# Patient Record
Sex: FEMALE | Race: WHITE | NOT HISPANIC OR LATINO | URBAN - METROPOLITAN AREA
[De-identification: names, ages, dates, MRNs, and addresses within clinical notes are randomized per-mention and may not be internally consistent; named-entity substitution may affect disease eponyms.]

---

## 2017-04-04 ENCOUNTER — INPATIENT (INPATIENT)
Facility: HOSPITAL | Age: 22
LOS: 2 days | Discharge: HOME | End: 2017-04-07
Attending: OBSTETRICS & GYNECOLOGY | Admitting: OBSTETRICS & GYNECOLOGY
Payer: COMMERCIAL

## 2017-04-04 VITALS — HEIGHT: 64 IN | WEIGHT: 132.28 LBS

## 2017-04-04 DIAGNOSIS — O47.1 FALSE LABOR AT OR AFTER 37 COMPLETED WEEKS OF GESTATION: ICD-10-CM

## 2017-04-04 DIAGNOSIS — Z34.80 ENCOUNTER FOR SUPERVISION OF OTHER NORMAL PREGNANCY, UNSPECIFIED TRIMESTER: ICD-10-CM

## 2017-04-04 LAB
BASOPHILS # BLD AUTO: 0 K/UL — SIGNIFICANT CHANGE UP (ref 0–0.2)
BASOPHILS NFR BLD AUTO: 0.2 % — SIGNIFICANT CHANGE UP (ref 0–2)
EOSINOPHIL # BLD AUTO: 0.1 K/UL — SIGNIFICANT CHANGE UP (ref 0–0.5)
EOSINOPHIL NFR BLD AUTO: 1.2 % — SIGNIFICANT CHANGE UP (ref 0–6)
HCT VFR BLD CALC: 33.8 % — LOW (ref 34.5–45)
HGB BLD-MCNC: 11.6 G/DL — SIGNIFICANT CHANGE UP (ref 11.5–15.5)
LYMPHOCYTES # BLD AUTO: 1.7 K/UL — SIGNIFICANT CHANGE UP (ref 1–3.3)
LYMPHOCYTES # BLD AUTO: 21.2 % — SIGNIFICANT CHANGE UP (ref 13–44)
MCHC RBC-ENTMCNC: 31.2 PG — SIGNIFICANT CHANGE UP (ref 27–34)
MCHC RBC-ENTMCNC: 34.2 GM/DL — SIGNIFICANT CHANGE UP (ref 32–36)
MCV RBC AUTO: 91.3 FL — SIGNIFICANT CHANGE UP (ref 80–100)
MONOCYTES # BLD AUTO: 0.6 K/UL — SIGNIFICANT CHANGE UP (ref 0–0.9)
MONOCYTES NFR BLD AUTO: 7.7 % — SIGNIFICANT CHANGE UP (ref 2–14)
NEUTROPHILS # BLD AUTO: 5.5 K/UL — SIGNIFICANT CHANGE UP (ref 1.8–7.4)
NEUTROPHILS NFR BLD AUTO: 69.7 % — SIGNIFICANT CHANGE UP (ref 43–77)
PLATELET # BLD AUTO: 167 K/UL — SIGNIFICANT CHANGE UP (ref 150–400)
RBC # BLD: 3.7 M/UL — LOW (ref 3.8–5.2)
RBC # FLD: 12.5 % — SIGNIFICANT CHANGE UP (ref 10.3–14.5)
WBC # BLD: 7.9 K/UL — SIGNIFICANT CHANGE UP (ref 3.8–10.5)
WBC # FLD AUTO: 7.9 K/UL — SIGNIFICANT CHANGE UP (ref 3.8–10.5)

## 2017-04-04 RX ORDER — SODIUM CHLORIDE 9 MG/ML
500 INJECTION, SOLUTION INTRAVENOUS ONCE
Qty: 0 | Refills: 0 | Status: COMPLETED | OUTPATIENT
Start: 2017-04-04 | End: 2017-04-04

## 2017-04-04 RX ORDER — PENICILLIN G POTASSIUM 5000000 [IU]/1
POWDER, FOR SOLUTION INTRAMUSCULAR; INTRAPLEURAL; INTRATHECAL; INTRAVENOUS
Qty: 0 | Refills: 0 | Status: DISCONTINUED | OUTPATIENT
Start: 2017-04-04 | End: 2017-04-05

## 2017-04-04 RX ORDER — PENICILLIN G POTASSIUM 5000000 [IU]/1
5 POWDER, FOR SOLUTION INTRAMUSCULAR; INTRAPLEURAL; INTRATHECAL; INTRAVENOUS ONCE
Qty: 0 | Refills: 0 | Status: COMPLETED | OUTPATIENT
Start: 2017-04-04 | End: 2017-04-04

## 2017-04-04 RX ORDER — CITRIC ACID/SODIUM CITRATE 300-500 MG
15 SOLUTION, ORAL ORAL EVERY 4 HOURS
Qty: 0 | Refills: 0 | Status: DISCONTINUED | OUTPATIENT
Start: 2017-04-04 | End: 2017-04-05

## 2017-04-04 RX ORDER — PENICILLIN G POTASSIUM 5000000 [IU]/1
2.5 POWDER, FOR SOLUTION INTRAMUSCULAR; INTRAPLEURAL; INTRATHECAL; INTRAVENOUS EVERY 4 HOURS
Qty: 0 | Refills: 0 | Status: DISCONTINUED | OUTPATIENT
Start: 2017-04-05 | End: 2017-04-05

## 2017-04-04 RX ORDER — SODIUM CHLORIDE 9 MG/ML
1000 INJECTION, SOLUTION INTRAVENOUS
Qty: 0 | Refills: 0 | Status: DISCONTINUED | OUTPATIENT
Start: 2017-04-04 | End: 2017-04-05

## 2017-04-04 RX ORDER — OXYTOCIN 10 UNIT/ML
333.33 VIAL (ML) INJECTION
Qty: 20 | Refills: 0 | Status: DISCONTINUED | OUTPATIENT
Start: 2017-04-04 | End: 2017-04-05

## 2017-04-04 RX ADMIN — PENICILLIN G POTASSIUM 200 MILLION UNIT(S): 5000000 POWDER, FOR SOLUTION INTRAMUSCULAR; INTRAPLEURAL; INTRATHECAL; INTRAVENOUS at 21:29

## 2017-04-04 RX ADMIN — SODIUM CHLORIDE 1000 MILLILITER(S): 9 INJECTION, SOLUTION INTRAVENOUS at 21:29

## 2017-04-05 LAB
BLD GP AB SCN SERPL QL: NEGATIVE — SIGNIFICANT CHANGE UP
RH IG SCN BLD-IMP: POSITIVE — SIGNIFICANT CHANGE UP
T PALLIDUM AB TITR SER: NEGATIVE — SIGNIFICANT CHANGE UP

## 2017-04-05 RX ORDER — HYDROCORTISONE 1 %
1 OINTMENT (GRAM) TOPICAL EVERY 4 HOURS
Qty: 0 | Refills: 0 | Status: DISCONTINUED | OUTPATIENT
Start: 2017-04-06 | End: 2017-04-07

## 2017-04-05 RX ORDER — OXYTOCIN 10 UNIT/ML
4 VIAL (ML) INJECTION
Qty: 30 | Refills: 0 | Status: DISCONTINUED | OUTPATIENT
Start: 2017-04-05 | End: 2017-04-05

## 2017-04-05 RX ORDER — OXYTOCIN 10 UNIT/ML
333.33 VIAL (ML) INJECTION
Qty: 20 | Refills: 0 | Status: DISCONTINUED | OUTPATIENT
Start: 2017-04-05 | End: 2017-04-07

## 2017-04-05 RX ORDER — OXYTOCIN 10 UNIT/ML
41.67 VIAL (ML) INJECTION
Qty: 20 | Refills: 0 | Status: DISCONTINUED | OUTPATIENT
Start: 2017-04-06 | End: 2017-04-07

## 2017-04-05 RX ORDER — SODIUM CHLORIDE 9 MG/ML
3 INJECTION INTRAMUSCULAR; INTRAVENOUS; SUBCUTANEOUS EVERY 8 HOURS
Qty: 0 | Refills: 0 | Status: DISCONTINUED | OUTPATIENT
Start: 2017-04-05 | End: 2017-04-05

## 2017-04-05 RX ORDER — SODIUM CHLORIDE 9 MG/ML
3 INJECTION INTRAMUSCULAR; INTRAVENOUS; SUBCUTANEOUS EVERY 8 HOURS
Qty: 0 | Refills: 0 | Status: DISCONTINUED | OUTPATIENT
Start: 2017-04-06 | End: 2017-04-07

## 2017-04-05 RX ORDER — OXYCODONE HYDROCHLORIDE 5 MG/1
5 TABLET ORAL
Qty: 0 | Refills: 0 | Status: DISCONTINUED | OUTPATIENT
Start: 2017-04-05 | End: 2017-04-07

## 2017-04-05 RX ORDER — DIBUCAINE 1 %
1 OINTMENT (GRAM) RECTAL EVERY 4 HOURS
Qty: 0 | Refills: 0 | Status: DISCONTINUED | OUTPATIENT
Start: 2017-04-05 | End: 2017-04-05

## 2017-04-05 RX ORDER — KETOROLAC TROMETHAMINE 30 MG/ML
30 SYRINGE (ML) INJECTION ONCE
Qty: 0 | Refills: 0 | Status: DISCONTINUED | OUTPATIENT
Start: 2017-04-05 | End: 2017-04-05

## 2017-04-05 RX ORDER — AER TRAVELER 0.5 G/1
1 SOLUTION RECTAL; TOPICAL EVERY 4 HOURS
Qty: 0 | Refills: 0 | Status: DISCONTINUED | OUTPATIENT
Start: 2017-04-05 | End: 2017-04-05

## 2017-04-05 RX ORDER — PRAMOXINE HYDROCHLORIDE 150 MG/15G
1 AEROSOL, FOAM RECTAL EVERY 4 HOURS
Qty: 0 | Refills: 0 | Status: DISCONTINUED | OUTPATIENT
Start: 2017-04-06 | End: 2017-04-07

## 2017-04-05 RX ORDER — FAMOTIDINE 10 MG/ML
20 INJECTION INTRAVENOUS ONCE
Qty: 0 | Refills: 0 | Status: COMPLETED | OUTPATIENT
Start: 2017-04-05 | End: 2017-04-05

## 2017-04-05 RX ORDER — LANOLIN
1 OINTMENT (GRAM) TOPICAL EVERY 6 HOURS
Qty: 0 | Refills: 0 | Status: DISCONTINUED | OUTPATIENT
Start: 2017-04-06 | End: 2017-04-07

## 2017-04-05 RX ORDER — DOCUSATE SODIUM 100 MG
100 CAPSULE ORAL
Qty: 0 | Refills: 0 | Status: DISCONTINUED | OUTPATIENT
Start: 2017-04-06 | End: 2017-04-07

## 2017-04-05 RX ORDER — PRAMOXINE HYDROCHLORIDE 150 MG/15G
1 AEROSOL, FOAM RECTAL EVERY 4 HOURS
Qty: 0 | Refills: 0 | Status: DISCONTINUED | OUTPATIENT
Start: 2017-04-05 | End: 2017-04-05

## 2017-04-05 RX ORDER — OXYTOCIN 10 UNIT/ML
333.33 VIAL (ML) INJECTION
Qty: 20 | Refills: 0 | Status: COMPLETED | OUTPATIENT
Start: 2017-04-05

## 2017-04-05 RX ORDER — DIBUCAINE 1 %
1 OINTMENT (GRAM) RECTAL EVERY 4 HOURS
Qty: 0 | Refills: 0 | Status: DISCONTINUED | OUTPATIENT
Start: 2017-04-06 | End: 2017-04-07

## 2017-04-05 RX ORDER — ACETAMINOPHEN 500 MG
975 TABLET ORAL EVERY 6 HOURS
Qty: 0 | Refills: 0 | Status: DISCONTINUED | OUTPATIENT
Start: 2017-04-05 | End: 2017-04-07

## 2017-04-05 RX ORDER — IBUPROFEN 200 MG
600 TABLET ORAL EVERY 6 HOURS
Qty: 0 | Refills: 0 | Status: DISCONTINUED | OUTPATIENT
Start: 2017-04-06 | End: 2017-04-07

## 2017-04-05 RX ORDER — OXYCODONE HYDROCHLORIDE 5 MG/1
5 TABLET ORAL EVERY 4 HOURS
Qty: 0 | Refills: 0 | Status: DISCONTINUED | OUTPATIENT
Start: 2017-04-05 | End: 2017-04-07

## 2017-04-05 RX ORDER — AER TRAVELER 0.5 G/1
1 SOLUTION RECTAL; TOPICAL EVERY 4 HOURS
Qty: 0 | Refills: 0 | Status: DISCONTINUED | OUTPATIENT
Start: 2017-04-06 | End: 2017-04-07

## 2017-04-05 RX ORDER — DIPHENHYDRAMINE HCL 50 MG
25 CAPSULE ORAL EVERY 6 HOURS
Qty: 0 | Refills: 0 | Status: DISCONTINUED | OUTPATIENT
Start: 2017-04-06 | End: 2017-04-07

## 2017-04-05 RX ORDER — MAGNESIUM HYDROXIDE 400 MG/1
30 TABLET, CHEWABLE ORAL
Qty: 0 | Refills: 0 | Status: DISCONTINUED | OUTPATIENT
Start: 2017-04-06 | End: 2017-04-07

## 2017-04-05 RX ORDER — SIMETHICONE 80 MG/1
80 TABLET, CHEWABLE ORAL EVERY 6 HOURS
Qty: 0 | Refills: 0 | Status: DISCONTINUED | OUTPATIENT
Start: 2017-04-06 | End: 2017-04-07

## 2017-04-05 RX ORDER — ACETAMINOPHEN 500 MG
650 TABLET ORAL ONCE
Qty: 0 | Refills: 0 | Status: DISCONTINUED | OUTPATIENT
Start: 2017-04-05 | End: 2017-04-05

## 2017-04-05 RX ORDER — OXYTOCIN 10 UNIT/ML
41.67 VIAL (ML) INJECTION
Qty: 20 | Refills: 0 | Status: DISCONTINUED | OUTPATIENT
Start: 2017-04-05 | End: 2017-04-05

## 2017-04-05 RX ORDER — HYDROCORTISONE 1 %
1 OINTMENT (GRAM) TOPICAL EVERY 4 HOURS
Qty: 0 | Refills: 0 | Status: DISCONTINUED | OUTPATIENT
Start: 2017-04-05 | End: 2017-04-05

## 2017-04-05 RX ORDER — GLYCERIN ADULT
1 SUPPOSITORY, RECTAL RECTAL AT BEDTIME
Qty: 0 | Refills: 0 | Status: DISCONTINUED | OUTPATIENT
Start: 2017-04-06 | End: 2017-04-07

## 2017-04-05 RX ADMIN — SODIUM CHLORIDE 125 MILLILITER(S): 9 INJECTION, SOLUTION INTRAVENOUS at 05:06

## 2017-04-05 RX ADMIN — FAMOTIDINE 20 MILLIGRAM(S): 10 INJECTION INTRAVENOUS at 18:05

## 2017-04-05 RX ADMIN — PENICILLIN G POTASSIUM 200 MILLION UNIT(S): 5000000 POWDER, FOR SOLUTION INTRAMUSCULAR; INTRAPLEURAL; INTRATHECAL; INTRAVENOUS at 01:30

## 2017-04-05 RX ADMIN — Medication 1000 MILLIUNIT(S)/MIN: at 21:53

## 2017-04-05 RX ADMIN — Medication 4 MILLIUNIT(S)/MIN: at 12:06

## 2017-04-05 RX ADMIN — PENICILLIN G POTASSIUM 200 MILLION UNIT(S): 5000000 POWDER, FOR SOLUTION INTRAMUSCULAR; INTRAPLEURAL; INTRATHECAL; INTRAVENOUS at 19:25

## 2017-04-05 RX ADMIN — PENICILLIN G POTASSIUM 200 MILLION UNIT(S): 5000000 POWDER, FOR SOLUTION INTRAMUSCULAR; INTRAPLEURAL; INTRATHECAL; INTRAVENOUS at 11:09

## 2017-04-05 RX ADMIN — Medication 30 MILLIGRAM(S): at 21:28

## 2017-04-05 RX ADMIN — Medication 4 MILLIUNIT(S)/MIN: at 19:26

## 2017-04-05 RX ADMIN — PENICILLIN G POTASSIUM 200 MILLION UNIT(S): 5000000 POWDER, FOR SOLUTION INTRAMUSCULAR; INTRAPLEURAL; INTRATHECAL; INTRAVENOUS at 15:02

## 2017-04-05 RX ADMIN — PENICILLIN G POTASSIUM 200 MILLION UNIT(S): 5000000 POWDER, FOR SOLUTION INTRAMUSCULAR; INTRAPLEURAL; INTRATHECAL; INTRAVENOUS at 06:19

## 2017-04-06 LAB
HCT VFR BLD CALC: 35.9 % — SIGNIFICANT CHANGE UP (ref 34.5–45)
HGB BLD-MCNC: 12.1 G/DL — SIGNIFICANT CHANGE UP (ref 11.5–15.5)

## 2017-04-06 RX ADMIN — Medication 600 MILLIGRAM(S): at 05:36

## 2017-04-06 RX ADMIN — OXYCODONE HYDROCHLORIDE 5 MILLIGRAM(S): 5 TABLET ORAL at 00:32

## 2017-04-06 RX ADMIN — Medication 600 MILLIGRAM(S): at 12:45

## 2017-04-06 RX ADMIN — Medication 975 MILLIGRAM(S): at 18:22

## 2017-04-06 RX ADMIN — Medication 100 MILLIGRAM(S): at 20:00

## 2017-04-06 RX ADMIN — Medication 975 MILLIGRAM(S): at 11:57

## 2017-04-06 RX ADMIN — Medication 100 MILLIGRAM(S): at 05:36

## 2017-04-06 RX ADMIN — OXYCODONE HYDROCHLORIDE 5 MILLIGRAM(S): 5 TABLET ORAL at 01:18

## 2017-04-06 RX ADMIN — Medication 600 MILLIGRAM(S): at 06:04

## 2017-04-06 RX ADMIN — Medication 975 MILLIGRAM(S): at 01:18

## 2017-04-06 RX ADMIN — Medication 600 MILLIGRAM(S): at 18:22

## 2017-04-06 RX ADMIN — Medication 975 MILLIGRAM(S): at 19:15

## 2017-04-06 RX ADMIN — Medication 975 MILLIGRAM(S): at 00:33

## 2017-04-06 RX ADMIN — Medication 975 MILLIGRAM(S): at 06:04

## 2017-04-06 RX ADMIN — Medication 600 MILLIGRAM(S): at 19:15

## 2017-04-06 RX ADMIN — Medication 600 MILLIGRAM(S): at 11:57

## 2017-04-06 RX ADMIN — Medication 975 MILLIGRAM(S): at 05:35

## 2017-04-06 RX ADMIN — Medication 975 MILLIGRAM(S): at 12:45

## 2017-04-06 RX ADMIN — Medication 1 TABLET(S): at 11:59

## 2017-04-07 VITALS
TEMPERATURE: 98 F | DIASTOLIC BLOOD PRESSURE: 71 MMHG | RESPIRATION RATE: 18 BRPM | SYSTOLIC BLOOD PRESSURE: 106 MMHG | HEART RATE: 69 BPM

## 2017-04-07 PROCEDURE — 86901 BLOOD TYPING SEROLOGIC RH(D): CPT

## 2017-04-07 PROCEDURE — 86780 TREPONEMA PALLIDUM: CPT

## 2017-04-07 PROCEDURE — 85018 HEMOGLOBIN: CPT

## 2017-04-07 PROCEDURE — G0463: CPT

## 2017-04-07 PROCEDURE — 86900 BLOOD TYPING SEROLOGIC ABO: CPT

## 2017-04-07 PROCEDURE — 59050 FETAL MONITOR W/REPORT: CPT

## 2017-04-07 PROCEDURE — 85027 COMPLETE CBC AUTOMATED: CPT

## 2017-04-07 PROCEDURE — 86850 RBC ANTIBODY SCREEN: CPT

## 2017-04-07 PROCEDURE — 59025 FETAL NON-STRESS TEST: CPT

## 2017-04-07 RX ADMIN — Medication 100 MILLIGRAM(S): at 13:27

## 2017-04-07 RX ADMIN — Medication 975 MILLIGRAM(S): at 14:02

## 2017-04-07 RX ADMIN — Medication 600 MILLIGRAM(S): at 01:30

## 2017-04-07 RX ADMIN — Medication 975 MILLIGRAM(S): at 01:30

## 2017-04-07 RX ADMIN — Medication 100 MILLIGRAM(S): at 08:51

## 2017-04-07 RX ADMIN — Medication 600 MILLIGRAM(S): at 06:30

## 2017-04-07 RX ADMIN — Medication 600 MILLIGRAM(S): at 05:41

## 2017-04-07 RX ADMIN — Medication 975 MILLIGRAM(S): at 06:30

## 2017-04-07 RX ADMIN — Medication 975 MILLIGRAM(S): at 05:40

## 2017-04-07 RX ADMIN — Medication 600 MILLIGRAM(S): at 11:21

## 2017-04-07 RX ADMIN — Medication 600 MILLIGRAM(S): at 00:39

## 2017-04-07 RX ADMIN — Medication 1 TABLET(S): at 08:51

## 2017-04-07 RX ADMIN — Medication 975 MILLIGRAM(S): at 00:39

## 2017-04-07 RX ADMIN — Medication 600 MILLIGRAM(S): at 12:00

## 2017-04-07 NOTE — DISCHARGE NOTE OB - PATIENT PORTAL LINK FT
“You can access the FollowHealth Patient Portal, offered by [facility], by registering with the following website: [email or web address]”

## 2017-04-07 NOTE — DISCHARGE NOTE OB - CARE PROVIDER_API CALL
Kwame Bentley), Obstetrics and Gynecology  [address] [place] [address]  Phone: [phone]  Fax: [phone]

## 2017-04-07 NOTE — DISCHARGE NOTE OB - CARE PLAN
Principal Discharge DX:	Pregnancy  Goal:	home  Instructions for follow-up, activity and diet:	per pmd

## 2017-04-07 NOTE — DISCHARGE NOTE OB - MATERIALS PROVIDED
Breastfeeding Log/Birth Certificate Instructions/Discharge Medication Information for Patients and Families Pocket Guide/  Immunization Record/Breastfeeding Guide and Packet/[facility]  Screening Program/Breastfeeding Mother’s Support Group Information/Guide to Postpartum Care/Back To Sleep Handout/[facility] Hearing Screen Program/Shaken Baby Prevention Handout/Vaccinations

## 2020-10-03 ENCOUNTER — INPATIENT (INPATIENT)
Facility: HOSPITAL | Age: 25
LOS: 0 days | Discharge: HOME | End: 2020-10-04
Attending: OBSTETRICS & GYNECOLOGY | Admitting: OBSTETRICS & GYNECOLOGY
Payer: COMMERCIAL

## 2020-10-03 VITALS — HEIGHT: 64 IN | WEIGHT: 138.89 LBS

## 2020-10-03 DIAGNOSIS — Z3A.00 WEEKS OF GESTATION OF PREGNANCY NOT SPECIFIED: ICD-10-CM

## 2020-10-03 DIAGNOSIS — O26.899 OTHER SPECIFIED PREGNANCY RELATED CONDITIONS, UNSPECIFIED TRIMESTER: ICD-10-CM

## 2020-10-03 DIAGNOSIS — Z34.80 ENCOUNTER FOR SUPERVISION OF OTHER NORMAL PREGNANCY, UNSPECIFIED TRIMESTER: ICD-10-CM

## 2020-10-03 LAB
BASOPHILS # BLD AUTO: 0.03 K/UL — SIGNIFICANT CHANGE UP (ref 0–0.2)
BASOPHILS NFR BLD AUTO: 0.3 % — SIGNIFICANT CHANGE UP (ref 0–2)
BLD GP AB SCN SERPL QL: NEGATIVE — SIGNIFICANT CHANGE UP
EOSINOPHIL # BLD AUTO: 0.04 K/UL — SIGNIFICANT CHANGE UP (ref 0–0.5)
EOSINOPHIL NFR BLD AUTO: 0.4 % — SIGNIFICANT CHANGE UP (ref 0–6)
HCT VFR BLD CALC: 40.1 % — SIGNIFICANT CHANGE UP (ref 34.5–45)
HGB BLD-MCNC: 12.9 G/DL — SIGNIFICANT CHANGE UP (ref 11.5–15.5)
IMM GRANULOCYTES NFR BLD AUTO: 1.2 % — SIGNIFICANT CHANGE UP (ref 0–1.5)
LYMPHOCYTES # BLD AUTO: 2.25 K/UL — SIGNIFICANT CHANGE UP (ref 1–3.3)
LYMPHOCYTES # BLD AUTO: 20.1 % — SIGNIFICANT CHANGE UP (ref 13–44)
MCHC RBC-ENTMCNC: 30.6 PG — SIGNIFICANT CHANGE UP (ref 27–34)
MCHC RBC-ENTMCNC: 32.2 GM/DL — SIGNIFICANT CHANGE UP (ref 32–36)
MCV RBC AUTO: 95.2 FL — SIGNIFICANT CHANGE UP (ref 80–100)
MONOCYTES # BLD AUTO: 0.67 K/UL — SIGNIFICANT CHANGE UP (ref 0–0.9)
MONOCYTES NFR BLD AUTO: 6 % — SIGNIFICANT CHANGE UP (ref 2–14)
NEUTROPHILS # BLD AUTO: 8.08 K/UL — HIGH (ref 1.8–7.4)
NEUTROPHILS NFR BLD AUTO: 72 % — SIGNIFICANT CHANGE UP (ref 43–77)
NRBC # BLD: 0 /100 WBCS — SIGNIFICANT CHANGE UP (ref 0–0)
PLATELET # BLD AUTO: 218 K/UL — SIGNIFICANT CHANGE UP (ref 150–400)
RBC # BLD: 4.21 M/UL — SIGNIFICANT CHANGE UP (ref 3.8–5.2)
RBC # FLD: 14.4 % — SIGNIFICANT CHANGE UP (ref 10.3–14.5)
RH IG SCN BLD-IMP: POSITIVE — SIGNIFICANT CHANGE UP
SARS-COV-2 RNA SPEC QL NAA+PROBE: SIGNIFICANT CHANGE UP
T PALLIDUM AB TITR SER: NEGATIVE — SIGNIFICANT CHANGE UP
WBC # BLD: 11.2 K/UL — HIGH (ref 3.8–10.5)
WBC # FLD AUTO: 11.2 K/UL — HIGH (ref 3.8–10.5)

## 2020-10-03 RX ORDER — DIPHENHYDRAMINE HCL 50 MG
25 CAPSULE ORAL EVERY 6 HOURS
Refills: 0 | Status: DISCONTINUED | OUTPATIENT
Start: 2020-10-03 | End: 2020-10-04

## 2020-10-03 RX ORDER — OXYTOCIN 10 UNIT/ML
333.33 VIAL (ML) INJECTION
Qty: 20 | Refills: 0 | Status: DISCONTINUED | OUTPATIENT
Start: 2020-10-03 | End: 2020-10-04

## 2020-10-03 RX ORDER — SODIUM CHLORIDE 9 MG/ML
3 INJECTION INTRAMUSCULAR; INTRAVENOUS; SUBCUTANEOUS EVERY 8 HOURS
Refills: 0 | Status: DISCONTINUED | OUTPATIENT
Start: 2020-10-03 | End: 2020-10-04

## 2020-10-03 RX ORDER — AER TRAVELER 0.5 G/1
1 SOLUTION RECTAL; TOPICAL EVERY 4 HOURS
Refills: 0 | Status: DISCONTINUED | OUTPATIENT
Start: 2020-10-03 | End: 2020-10-04

## 2020-10-03 RX ORDER — IBUPROFEN 200 MG
600 TABLET ORAL EVERY 6 HOURS
Refills: 0 | Status: COMPLETED | OUTPATIENT
Start: 2020-10-03 | End: 2021-09-01

## 2020-10-03 RX ORDER — CITRIC ACID/SODIUM CITRATE 300-500 MG
15 SOLUTION, ORAL ORAL EVERY 6 HOURS
Refills: 0 | Status: DISCONTINUED | OUTPATIENT
Start: 2020-10-03 | End: 2020-10-03

## 2020-10-03 RX ORDER — PRAMOXINE HYDROCHLORIDE 150 MG/15G
1 AEROSOL, FOAM RECTAL EVERY 4 HOURS
Refills: 0 | Status: DISCONTINUED | OUTPATIENT
Start: 2020-10-03 | End: 2020-10-04

## 2020-10-03 RX ORDER — BENZOCAINE 10 %
1 GEL (GRAM) MUCOUS MEMBRANE EVERY 6 HOURS
Refills: 0 | Status: DISCONTINUED | OUTPATIENT
Start: 2020-10-03 | End: 2020-10-04

## 2020-10-03 RX ORDER — SODIUM CHLORIDE 9 MG/ML
1000 INJECTION, SOLUTION INTRAVENOUS
Refills: 0 | Status: DISCONTINUED | OUTPATIENT
Start: 2020-10-03 | End: 2020-10-03

## 2020-10-03 RX ORDER — HYDROCORTISONE 1 %
1 OINTMENT (GRAM) TOPICAL EVERY 6 HOURS
Refills: 0 | Status: DISCONTINUED | OUTPATIENT
Start: 2020-10-03 | End: 2020-10-04

## 2020-10-03 RX ORDER — IBUPROFEN 200 MG
600 TABLET ORAL EVERY 6 HOURS
Refills: 0 | Status: DISCONTINUED | OUTPATIENT
Start: 2020-10-03 | End: 2020-10-04

## 2020-10-03 RX ORDER — OXYCODONE HYDROCHLORIDE 5 MG/1
5 TABLET ORAL
Refills: 0 | Status: DISCONTINUED | OUTPATIENT
Start: 2020-10-03 | End: 2020-10-04

## 2020-10-03 RX ORDER — OXYCODONE HYDROCHLORIDE 5 MG/1
5 TABLET ORAL ONCE
Refills: 0 | Status: DISCONTINUED | OUTPATIENT
Start: 2020-10-03 | End: 2020-10-03

## 2020-10-03 RX ORDER — TETANUS TOXOID, REDUCED DIPHTHERIA TOXOID AND ACELLULAR PERTUSSIS VACCINE, ADSORBED 5; 2.5; 8; 8; 2.5 [IU]/.5ML; [IU]/.5ML; UG/.5ML; UG/.5ML; UG/.5ML
0.5 SUSPENSION INTRAMUSCULAR ONCE
Refills: 0 | Status: DISCONTINUED | OUTPATIENT
Start: 2020-10-03 | End: 2020-10-04

## 2020-10-03 RX ORDER — ACETAMINOPHEN 500 MG
975 TABLET ORAL EVERY 6 HOURS
Refills: 0 | Status: DISCONTINUED | OUTPATIENT
Start: 2020-10-03 | End: 2020-10-03

## 2020-10-03 RX ORDER — OXYCODONE HYDROCHLORIDE 5 MG/1
5 TABLET ORAL
Refills: 0 | Status: DISCONTINUED | OUTPATIENT
Start: 2020-10-03 | End: 2020-10-03

## 2020-10-03 RX ORDER — KETOROLAC TROMETHAMINE 30 MG/ML
30 SYRINGE (ML) INJECTION ONCE
Refills: 0 | Status: DISCONTINUED | OUTPATIENT
Start: 2020-10-03 | End: 2020-10-03

## 2020-10-03 RX ORDER — OXYCODONE HYDROCHLORIDE 5 MG/1
5 TABLET ORAL ONCE
Refills: 0 | Status: DISCONTINUED | OUTPATIENT
Start: 2020-10-03 | End: 2020-10-04

## 2020-10-03 RX ORDER — MAGNESIUM HYDROXIDE 400 MG/1
30 TABLET, CHEWABLE ORAL
Refills: 0 | Status: DISCONTINUED | OUTPATIENT
Start: 2020-10-03 | End: 2020-10-04

## 2020-10-03 RX ORDER — ACETAMINOPHEN 500 MG
1000 TABLET ORAL ONCE
Refills: 0 | Status: DISCONTINUED | OUTPATIENT
Start: 2020-10-03 | End: 2020-10-03

## 2020-10-03 RX ORDER — DIBUCAINE 1 %
1 OINTMENT (GRAM) RECTAL EVERY 6 HOURS
Refills: 0 | Status: DISCONTINUED | OUTPATIENT
Start: 2020-10-03 | End: 2020-10-04

## 2020-10-03 RX ORDER — ACETAMINOPHEN 500 MG
975 TABLET ORAL
Refills: 0 | Status: DISCONTINUED | OUTPATIENT
Start: 2020-10-03 | End: 2020-10-04

## 2020-10-03 RX ORDER — LANOLIN
1 OINTMENT (GRAM) TOPICAL EVERY 6 HOURS
Refills: 0 | Status: DISCONTINUED | OUTPATIENT
Start: 2020-10-03 | End: 2020-10-04

## 2020-10-03 RX ORDER — OXYTOCIN 10 UNIT/ML
333.33 VIAL (ML) INJECTION
Qty: 20 | Refills: 0 | Status: DISCONTINUED | OUTPATIENT
Start: 2020-10-03 | End: 2020-10-03

## 2020-10-03 RX ORDER — SIMETHICONE 80 MG/1
80 TABLET, CHEWABLE ORAL EVERY 4 HOURS
Refills: 0 | Status: DISCONTINUED | OUTPATIENT
Start: 2020-10-03 | End: 2020-10-04

## 2020-10-03 RX ADMIN — Medication 1000 MILLIUNIT(S)/MIN: at 06:40

## 2020-10-03 RX ADMIN — Medication 600 MILLIGRAM(S): at 20:57

## 2020-10-03 RX ADMIN — Medication 600 MILLIGRAM(S): at 21:55

## 2020-10-03 RX ADMIN — Medication 1000 MILLIUNIT(S)/MIN: at 10:21

## 2020-10-03 RX ADMIN — Medication 975 MILLIGRAM(S): at 17:53

## 2020-10-03 RX ADMIN — SODIUM CHLORIDE 125 MILLILITER(S): 9 INJECTION, SOLUTION INTRAVENOUS at 05:45

## 2020-10-03 RX ADMIN — Medication 30 MILLIGRAM(S): at 07:31

## 2020-10-03 RX ADMIN — Medication 975 MILLIGRAM(S): at 17:23

## 2020-10-03 RX ADMIN — Medication 600 MILLIGRAM(S): at 14:15

## 2020-10-03 RX ADMIN — Medication 600 MILLIGRAM(S): at 13:47

## 2020-10-03 RX ADMIN — Medication 975 MILLIGRAM(S): at 10:42

## 2020-10-04 ENCOUNTER — TRANSCRIPTION ENCOUNTER (OUTPATIENT)
Age: 25
End: 2020-10-04

## 2020-10-04 VITALS
HEART RATE: 60 BPM | RESPIRATION RATE: 18 BRPM | SYSTOLIC BLOOD PRESSURE: 104 MMHG | OXYGEN SATURATION: 98 % | DIASTOLIC BLOOD PRESSURE: 69 MMHG | TEMPERATURE: 98 F

## 2020-10-04 LAB
SARS-COV-2 IGG SERPL QL IA: NEGATIVE — SIGNIFICANT CHANGE UP
SARS-COV-2 IGM SERPL IA-ACNC: <0.1 INDEX — SIGNIFICANT CHANGE UP

## 2020-10-04 PROCEDURE — 85025 COMPLETE CBC W/AUTO DIFF WBC: CPT

## 2020-10-04 PROCEDURE — 86850 RBC ANTIBODY SCREEN: CPT

## 2020-10-04 PROCEDURE — 59050 FETAL MONITOR W/REPORT: CPT

## 2020-10-04 PROCEDURE — 59025 FETAL NON-STRESS TEST: CPT

## 2020-10-04 PROCEDURE — U0003: CPT

## 2020-10-04 PROCEDURE — 86780 TREPONEMA PALLIDUM: CPT

## 2020-10-04 PROCEDURE — 86900 BLOOD TYPING SEROLOGIC ABO: CPT

## 2020-10-04 PROCEDURE — 86769 SARS-COV-2 COVID-19 ANTIBODY: CPT

## 2020-10-04 PROCEDURE — G0463: CPT

## 2020-10-04 PROCEDURE — 86901 BLOOD TYPING SEROLOGIC RH(D): CPT

## 2020-10-04 RX ADMIN — Medication 975 MILLIGRAM(S): at 00:11

## 2020-10-04 RX ADMIN — Medication 600 MILLIGRAM(S): at 04:00

## 2020-10-04 RX ADMIN — Medication 600 MILLIGRAM(S): at 09:40

## 2020-10-04 RX ADMIN — Medication 975 MILLIGRAM(S): at 01:10

## 2020-10-04 RX ADMIN — Medication 600 MILLIGRAM(S): at 15:31

## 2020-10-04 RX ADMIN — Medication 975 MILLIGRAM(S): at 05:51

## 2020-10-04 RX ADMIN — Medication 975 MILLIGRAM(S): at 11:30

## 2020-10-04 RX ADMIN — Medication 600 MILLIGRAM(S): at 09:08

## 2020-10-04 RX ADMIN — Medication 600 MILLIGRAM(S): at 03:02

## 2020-10-04 RX ADMIN — Medication 975 MILLIGRAM(S): at 06:50

## 2020-10-04 RX ADMIN — Medication 975 MILLIGRAM(S): at 12:00

## 2020-10-04 NOTE — PROGRESS NOTE ADULT - SUBJECTIVE AND OBJECTIVE BOX
PA Postpartum Note- PPD#1    Prenatal Labs  Blood type: B Positive  Rubella IgG: IMMune  RPR: Negative        S:Patient w/o complaints, pain is controlled.    Pt is OOB, tolerating PO, voiding. Lochia WNL.     O:  Vital Signs Last 24 Hrs  T(C): 36.7 (04 Oct 2020 05:42), Max: 36.9 (03 Oct 2020 12:25)  T(F): 98 (04 Oct 2020 05:42), Max: 98.5 (03 Oct 2020 12:25)  HR: 71 (04 Oct 2020 05:42) (64 - 82)  BP: 111/70 (04 Oct 2020 05:42) (99/62 - 118/55)  BP(mean): --  RR: 18 (04 Oct 2020 05:42) (16 - 18)  SpO2: 95% (04 Oct 2020 05:42) (95% - 98%)     Gen: NAD  Abdomen: Soft, nontender, non-distended, fundus firm.  Vaginal: Lochia WNL,    Ext: Neg edema, Neg calf tenderness    LABS:    Hemoglobin: 12.9 g/dL (10-03 @ 07:54)      Hematocrit: 40.1 % (10-03 @ 07:54)

## 2020-10-04 NOTE — DISCHARGE NOTE OB - CARE PROVIDER_API CALL
José Miguel Valdovinos  OBSTETRICS AND GYNECOLOGY  [address] [place] [address], [facility] [address]  [place] [address]  Phone: [phone]  Fax: [phone]  Follow Up Time:

## 2020-10-04 NOTE — PROGRESS NOTE ADULT - PROVIDER SPECIALTY LIST ADULT
OB PAUL JEFFERSON 5/7/18 2006:


Clinic Account Progress/Dx


Physician Query:


Please give diagnosis


Date of Service





May 5, 2018 at 14:38





CANDELARIA COOPER MD 5/8/18 0717:


Clinic Account Progress/Dx


DIAGNOSIS:


Diagnosis


1.  Intrauterine pregnancy at 34 weeks gestation with uterine irritability, 

nonlabor











PAUL JEFFERSON May 7, 2018 20:06


CANDELARIA COOPER MD May 8, 2018 07:17

## 2020-10-04 NOTE — DISCHARGE NOTE OB - PATIENT PORTAL LINK FT
You can access the FollowMyHealth Patient Portal offered by [facility] by registering at the following website: [email or web address]. By joining [employer]’s FollowMyHealth portal, you will also be able to view your health information using other applications (apps) compatible with our system.

## 2020-10-04 NOTE — DISCHARGE NOTE OB - CARE PLAN
Principal Discharge DX:	Vaginal delivery  Goal:	post-partum care  Assessment and plan of treatment:	as directed

## 2022-10-31 ENCOUNTER — ASOB RESULT (OUTPATIENT)
Age: 27
End: 2022-10-31

## 2022-10-31 ENCOUNTER — APPOINTMENT (OUTPATIENT)
Dept: ANTEPARTUM | Facility: CLINIC | Age: 27
End: 2022-10-31

## 2022-10-31 PROBLEM — Z00.00 ENCOUNTER FOR PREVENTIVE HEALTH EXAMINATION: Status: ACTIVE | Noted: 2022-10-31

## 2022-10-31 PROCEDURE — 76811 OB US DETAILED SNGL FETUS: CPT

## 2022-11-14 ENCOUNTER — TRANSCRIPTION ENCOUNTER (OUTPATIENT)
Age: 27
End: 2022-11-14

## 2023-03-22 ENCOUNTER — NON-APPOINTMENT (OUTPATIENT)
Age: 28
End: 2023-03-22

## 2023-03-25 ENCOUNTER — INPATIENT (INPATIENT)
Facility: HOSPITAL | Age: 28
LOS: 2 days | Discharge: HOME | End: 2023-03-28
Attending: STUDENT IN AN ORGANIZED HEALTH CARE EDUCATION/TRAINING PROGRAM | Admitting: STUDENT IN AN ORGANIZED HEALTH CARE EDUCATION/TRAINING PROGRAM
Payer: COMMERCIAL

## 2023-03-25 VITALS — OXYGEN SATURATION: 98 %

## 2023-03-25 DIAGNOSIS — Z85.71 PERSONAL HISTORY OF HODGKIN LYMPHOMA: Chronic | ICD-10-CM

## 2023-03-25 DIAGNOSIS — Z98.890 OTHER SPECIFIED POSTPROCEDURAL STATES: Chronic | ICD-10-CM

## 2023-03-25 DIAGNOSIS — Z33.1 PREGNANT STATE, INCIDENTAL: ICD-10-CM

## 2023-03-25 LAB
BASOPHILS # BLD AUTO: 0.03 K/UL — SIGNIFICANT CHANGE UP (ref 0–0.2)
BASOPHILS NFR BLD AUTO: 0.4 % — SIGNIFICANT CHANGE UP (ref 0–2)
BLD GP AB SCN SERPL QL: NEGATIVE — SIGNIFICANT CHANGE UP
EOSINOPHIL # BLD AUTO: 0.08 K/UL — SIGNIFICANT CHANGE UP (ref 0–0.5)
EOSINOPHIL NFR BLD AUTO: 1.1 % — SIGNIFICANT CHANGE UP (ref 0–6)
HCT VFR BLD CALC: 35 % — SIGNIFICANT CHANGE UP (ref 34.5–45)
HGB BLD-MCNC: 10.9 G/DL — LOW (ref 11.5–15.5)
IMM GRANULOCYTES NFR BLD AUTO: 1.2 % — HIGH (ref 0–0.9)
LYMPHOCYTES # BLD AUTO: 2.2 K/UL — SIGNIFICANT CHANGE UP (ref 1–3.3)
LYMPHOCYTES # BLD AUTO: 29.6 % — SIGNIFICANT CHANGE UP (ref 13–44)
MCHC RBC-ENTMCNC: 29.1 PG — SIGNIFICANT CHANGE UP (ref 27–34)
MCHC RBC-ENTMCNC: 31.1 GM/DL — LOW (ref 32–36)
MCV RBC AUTO: 93.6 FL — SIGNIFICANT CHANGE UP (ref 80–100)
MONOCYTES # BLD AUTO: 0.65 K/UL — SIGNIFICANT CHANGE UP (ref 0–0.9)
MONOCYTES NFR BLD AUTO: 8.8 % — SIGNIFICANT CHANGE UP (ref 2–14)
NEUTROPHILS # BLD AUTO: 4.37 K/UL — SIGNIFICANT CHANGE UP (ref 1.8–7.4)
NEUTROPHILS NFR BLD AUTO: 58.9 % — SIGNIFICANT CHANGE UP (ref 43–77)
NRBC # BLD: 0 /100 WBCS — SIGNIFICANT CHANGE UP (ref 0–0)
PLATELET # BLD AUTO: 189 K/UL — SIGNIFICANT CHANGE UP (ref 150–400)
RBC # BLD: 3.74 M/UL — LOW (ref 3.8–5.2)
RBC # FLD: 14 % — SIGNIFICANT CHANGE UP (ref 10.3–14.5)
RH IG SCN BLD-IMP: POSITIVE — SIGNIFICANT CHANGE UP
WBC # BLD: 7.42 K/UL — SIGNIFICANT CHANGE UP (ref 3.8–10.5)
WBC # FLD AUTO: 7.42 K/UL — SIGNIFICANT CHANGE UP (ref 3.8–10.5)

## 2023-03-25 RX ORDER — SODIUM CHLORIDE 9 MG/ML
1000 INJECTION, SOLUTION INTRAVENOUS
Refills: 0 | Status: DISCONTINUED | OUTPATIENT
Start: 2023-03-25 | End: 2023-03-26

## 2023-03-25 RX ORDER — OXYTOCIN 10 UNIT/ML
333.33 VIAL (ML) INJECTION
Qty: 20 | Refills: 0 | Status: DISCONTINUED | OUTPATIENT
Start: 2023-03-25 | End: 2023-03-26

## 2023-03-25 RX ORDER — CHLORHEXIDINE GLUCONATE 213 G/1000ML
1 SOLUTION TOPICAL ONCE
Refills: 0 | Status: DISCONTINUED | OUTPATIENT
Start: 2023-03-25 | End: 2023-03-26

## 2023-03-25 RX ORDER — SODIUM CHLORIDE 9 MG/ML
1000 INJECTION, SOLUTION INTRAVENOUS
Refills: 0 | Status: DISCONTINUED | OUTPATIENT
Start: 2023-03-25 | End: 2023-03-28

## 2023-03-25 RX ADMIN — SODIUM CHLORIDE 125 MILLILITER(S): 9 INJECTION, SOLUTION INTRAVENOUS at 23:10

## 2023-03-25 RX ADMIN — SODIUM CHLORIDE 125 MILLILITER(S): 9 INJECTION, SOLUTION INTRAVENOUS at 22:50

## 2023-03-25 NOTE — OB RN PATIENT PROFILE - FUNCTIONAL ASSESSMENT - BASIC MOBILITY SCORE.
DM NORMAL: No signs of diabetic retinopathy or diabetic macular edema on exam. Continue blood sugar and blood pressure control; consider exercise program as directed by PCP. We will re-evaluate in one year or sooner as needed. Letter to PCP. 24

## 2023-03-25 NOTE — OB RN PATIENT PROFILE - BREASTFEEDING PROVIDES STABLE TEMPERATURE THROUGH SKIN TO SKIN CONTACT
DISPLAY PLAN FREE TEXT DISPLAY PLAN FREE TEXT DISPLAY PLAN FREE TEXT DISPLAY PLAN FREE TEXT DISPLAY PLAN FREE TEXT Statement Selected

## 2023-03-25 NOTE — OB RN PATIENT PROFILE - AS SC BRADEN MOISTURE
Patient:   SUMAYA BRYSON            MRN: [id number]            FIN: [id number]              Age:   87 years     Sex:  MALE     :  34   Associated Diagnoses:   None   Author:   ROMAINE FOUNTAIN     Subjective:  No acute events overnight  Continues to deny any chest pain or SOB  Scheduled for an AICD placement likely today per EP  Objective:  I & O between:  05-SEP-2019 05:36 TO 06-SEP-2019 05:36  Med Dosing Weight:  78.2  kg   30-AUG-2019  24 Hour Intake:   962.00  ( 12.30 mL/kg )  24 Hour Output:   2150.00           24 Hour Urine/Stool Output:   0.0  24 Hour Balance:   -1188.00           24 Hour Urine Output:   2150.00  ( 1.15 mL/kg/hr )                    Stool Count:  1     Vitals between:   05-SEP-2019 05:36:06   TO   06-SEP-2019 05:36:06                   LAST RESULT MINIMUM MAXIMUM  Temperature 36.0 35.8 36.5  Heart Rate 68 57 77  Respiratory Rate 23 11 25  NISBP           124 115 160  NIDBP           75 69 96  NIMBP           89 86 108  SpO2                    95 93 98  Medications (20) Active  Scheduled: (10)  Aspirin 81 mg chew tab  81 mg 1 tab, Oral, Daily  Atorvastatin 40 mg tab  40 mg 1 tab, Oral, Daily  Carvedilol 6.25 mg tab  6.25 mg 1 tab, Oral, Q12H  Clopidogrel 75 mg tab  75 mg 1 tab, Oral, Daily  Docusate sodium 100 mg cap  100 mg 1 cap, Oral, Q Bedtime  Famotidine 20 mg/2 mL inj SDV  20 mg 2 mL, Slow IV Push, Daily  FentaNYL 100 mcg/2 mL inj SDV  25 mcg 0.5 mL, IV Push, Once (scheduled)  Insulin human lispro 1 unit/0.01 mL inj  2-10 unit, Subcutaneous, QID [with meals & HS]  Mexiletine 150 mg cap  150 mg 1 cap, Oral, Q8H  Pneumococcal adult vaccine 23-polyvalent 0.5 mL IM inj SDV  0.5 mL, IM, On Call  Continuous: (1)  Sodium Chloride 0.9% 1,000 mL  1,000 mL, IV, 20 mL/hr  PRN: (9)  Acetaminophen 325 mg tab  650 mg 2 tab, Oral, Q4H  Calcium carbonate 500 mg chew tab [Ca 200 mg]  500 mg 1 tab, Chewed, TID  Dextrose (glucose) 40% 15 gm/37.5 gm oral gel UD  15 gm, Oral, As Directed PRN   Dextrose (glucose) 50% 25 gm/50 mL syringe  12.5 gm 25 mL, IV Push, As Directed PRN  FentaNYL 100 mcg/2 mL inj SDV  50 mcg 1 mL, IV Push, Q2H  Glucagon 1 mg/1 mL emergency kit SDV  1 mg 1 mL, IM, As Directed PRN  Guaifenesin--20 mg/10 mL oral liquid UD  5 mL, Oral, Q4H  HydrALAZINE 20 mg/1 mL inj SDV  10 mg 0.5 mL, Slow IV Push, Q6H  Sodium chloride PF 0.9% flush inj 3 mL  2 mL, Flush, As Directed PRN  GENERAL: NAD, afebrile  CHEST:  CTA b/l, no w/r/r  CARDIAC: Paced, RRR  VASCULAR: No pitting edema, +2 pedal pulses b/l  ABDOMEN:  Soft, NTTP, no masses  PSYCH: Normal moood and affect.  Labs:  Labs between:  05-SEP-2019 05:36 to 06-SEP-2019 05:36  CBC:                 WBC  HgB  Hct  Plt  MCV  RDW   06-SEP-2019 9.7  13.6  40.8  142  99.3  13.2   DIFF:                 Seg  Neutroph//ABS  Lymph//ABS  Mono//ABS  EOS/ABS  06-SEP-2019 NOT APPLICABLE  75 // 7.4 10 // (L) 0.9  12 // (H) 1.1  1 // 0.1  BMP:                 Na  Cl  BUN  Glu   06-SEP-2019 (L) 134  104  (H) 30  (H) 159                              K  CO2  Cr  Ca                              4.3  23  1.04  8.6   CMP:                 AST  ALT  AlkPhos  Bili  Albumin   06-SEP-2019 (H) 39  44  54  0.7  (L) 3.0   Other Chem:             Mg  Phos  Triglycerides  GGTP  DirectBili                           2.4  2.6         POC GLU:                 Latest Result  Latest Date  Minimum  Min Date  Maximum  Max Date                             (H) 238  05-SEP-2019 (H) 238  05-SEP-2019 (H) 130  05-SEP-2019  COAG:                 INR  PT  PTT  Ddimer  Fibrinogen    06-SEP-2019 1.2  (H) 12.6                        Assessment and plan:   # polymorphic VT  - likley 2/2 prolonged QT from LMCA occlusion  - Tentative AICD placement on 9/6/19  - Holding AC for now  - Continue Mexilitine 150 q8 per cards  - Continues to remain off of lidocaine drip  - Avoid QT prolonging drugs  - Continue to monitor QT.  - Cardiology following  # posterior wall MI   # CAD s/p CABGx2 2012  -   s/p cath DAMON to RCA and LMCA  - Cont asa, plavix  - Cont high intensity statin   - Avoid nitro patch for chest pain 2/2 recent RCA occlusion  # Afib   - Prescribed coumadin, however, doesn't take it  -  CHADsVASC 5  - Start warfarin. No bridge needed per cards  - Goal INR 2-3  - INR continues to be subtherapeutic  - Consider starting NOAC if pt continues to refuse warfarin. Defer to cards  - Holding AC d/t possible procedure tomorrow, will restart coumadin afterwards  # Elevated troponin 2/2 posterior MI  - Trend till peak  - 0.6 --> 24.7 --> 26. 1 --> 12.9 now down trended  - stopped checking  # HTN  -  coreg inc to 6.25 BID per Cards  - Hydralazine prn for SBP > 160  - Atropine if SBP < 80  - Holding home losartan due to soft pressures  # HLD  - Cont high intensity statin  # Ischemic cardiomyopathy   - 2017 EF 20-30%  - Repeat echo showe no change in EF  - Pre load dependent  FEN: NS @ 20; replete prn, NPO  PPx: GI (famotidine), AC (holding)  Code: Fullcode, decisional, wife surrogate will fill out POA paperwork wants daughter to be POA.  Pt will be discussed w/ Dr. Peña. Please await final recs  Concetta Luna DO  PGY 1    Pt examined and data reviewed independently. D/W housestaff.  Clinical course discussed in detail in ICU rounds  No new events  Going for ICD this morning  No new complaints  Physical examination  Afebrile, vital signs stable  Lungs are clear  Cardiovascular exam is regular  Abdomen is soft  Extremities warm well perfused  Labs pertinent for  Normal potassium and magnesium  Renal function normal  CBC unremarkable  Impression  Coronary artery disease status post STEMI with drug-eluting stent to the RCA and PCI to the left main  Polymorphic ventricular tachycardia  History of coronary disease status post bypass surgery in 2012  Ischemic cardia myopathy  Atrial fibrillation  Hypertension  Hyperlipidemia  JIMMY  Plan  Oral mexiletine  Temporary transvenous pacer per cardiology/EP  ICD  placement today  Continue Lovenox, transition back to warfarin after procedure  Aspirin, Plavix, statin  Coreg  Glycemic control  Monitor electrolytes, keep mag greater than 2, K greater than 4  Okay to transfer to the floor after ICD placed and transvenous pacer removed if okay with cardiology  Yariel Peña M.D.  [facility]  Office: [phone]  In-house: [phone]   (4) rarely moist

## 2023-03-25 NOTE — OB PROVIDER H&P - ASSESSMENT
26yo  @41w1d presenting for scheduled IOL for LT.  -admit to L&D  - for PO/CB, pit@4a  - GBS neg  - EFW 3175  - Routine Labs  - FHT/TOCO  - Anesthesia Consult  - Anticipate     Plan per Dr. Mc Rodgers, PGY-1

## 2023-03-25 NOTE — OB PROVIDER H&P - NSHPPHYSICALEXAM_GEN_ALL_CORE
T(C): 36.6 (03-25-23 @ 23:34), Max: 36.6 (03-25-23 @ 22:12)  HR: 80 (03-26-23 @ 00:30) (67 - 162)  BP: 114/61 (03-25-23 @ 23:34) (114/61 - 114/61)  RR: 16 (03-25-23 @ 23:34) (16 - 16)  SpO2: 98% (03-26-23 @ 00:30) (77% - 99%)    Gen: NAD  CV: clinically well perfused  Pulm: unlabored respirations  Abd: soft, NT, ND, gravid, no rebound or guarding  SVE: 1/50/-3  FHT: 140, mod christofer, + accels, - decels  TOCO: uterine irritability  Sono: cephalic

## 2023-03-25 NOTE — OB RN PATIENT PROFILE - FALL HARM RISK - UNIVERSAL INTERVENTIONS
Bed in lowest position, wheels locked, appropriate side rails in place/Call bell, personal items and telephone in reach/Instruct patient to call for assistance before getting out of bed or chair/Non-slip footwear when patient is out of bed/[place] to call system/Physically safe environment - no spills, clutter or unnecessary equipment/Purposeful Proactive Rounding/Room/bathroom lighting operational, light cord in reach

## 2023-03-25 NOTE — OB PROVIDER H&P - HISTORY OF PRESENT ILLNESS
26yo  @41w1d p/f scheduled IOL for late term. –VB, -LOF, -Ctx, +FM. Denies fever, chills, nausea, vomiting, diarrhea, headache, constipation, dizziness, syncope, chest pain, palpitations, shortness of breath, dysuria, urgency, frequency.  PNC: unc  GBS: neg  EFW: 3175  ObHx: FT  x3 (2017, 2018, 2020, all ~6#14)  GynHx: denies  MedHx: Hodgkin's lymphoma s/p chemo/radiation (10y ago)  PSHx: hernia repair (2016)  PsychHx: denies  SocialHx: denies  AllergyHx: denies  RxHx: PNV

## 2023-03-26 LAB
COVID-19 SPIKE DOMAIN AB INTERP: POSITIVE
COVID-19 SPIKE DOMAIN ANTIBODY RESULT: >250 U/ML — HIGH
SARS-COV-2 IGG+IGM SERPL QL IA: >250 U/ML — HIGH
SARS-COV-2 IGG+IGM SERPL QL IA: POSITIVE

## 2023-03-26 RX ORDER — OXYTOCIN 10 UNIT/ML
4 VIAL (ML) INJECTION
Qty: 30 | Refills: 0 | Status: DISCONTINUED | OUTPATIENT
Start: 2023-03-26 | End: 2023-03-26

## 2023-03-26 RX ORDER — BENZOCAINE 10 %
1 GEL (GRAM) MUCOUS MEMBRANE EVERY 6 HOURS
Refills: 0 | Status: DISCONTINUED | OUTPATIENT
Start: 2023-03-26 | End: 2023-03-28

## 2023-03-26 RX ORDER — IBUPROFEN 200 MG
600 TABLET ORAL EVERY 6 HOURS
Refills: 0 | Status: DISCONTINUED | OUTPATIENT
Start: 2023-03-26 | End: 2023-03-28

## 2023-03-26 RX ORDER — PRAMOXINE HYDROCHLORIDE 150 MG/15G
1 AEROSOL, FOAM RECTAL EVERY 4 HOURS
Refills: 0 | Status: DISCONTINUED | OUTPATIENT
Start: 2023-03-26 | End: 2023-03-28

## 2023-03-26 RX ORDER — OXYCODONE HYDROCHLORIDE 5 MG/1
5 TABLET ORAL
Refills: 0 | Status: DISCONTINUED | OUTPATIENT
Start: 2023-03-26 | End: 2023-03-28

## 2023-03-26 RX ORDER — MAGNESIUM HYDROXIDE 400 MG/1
30 TABLET, CHEWABLE ORAL
Refills: 0 | Status: DISCONTINUED | OUTPATIENT
Start: 2023-03-26 | End: 2023-03-28

## 2023-03-26 RX ORDER — SODIUM CHLORIDE 9 MG/ML
3 INJECTION INTRAMUSCULAR; INTRAVENOUS; SUBCUTANEOUS EVERY 8 HOURS
Refills: 0 | Status: DISCONTINUED | OUTPATIENT
Start: 2023-03-26 | End: 2023-03-28

## 2023-03-26 RX ORDER — LANOLIN
1 OINTMENT (GRAM) TOPICAL EVERY 6 HOURS
Refills: 0 | Status: DISCONTINUED | OUTPATIENT
Start: 2023-03-26 | End: 2023-03-28

## 2023-03-26 RX ORDER — ACETAMINOPHEN 500 MG
975 TABLET ORAL
Refills: 0 | Status: DISCONTINUED | OUTPATIENT
Start: 2023-03-26 | End: 2023-03-28

## 2023-03-26 RX ORDER — IBUPROFEN 200 MG
600 TABLET ORAL EVERY 6 HOURS
Refills: 0 | Status: COMPLETED | OUTPATIENT
Start: 2023-03-26 | End: 2024-02-22

## 2023-03-26 RX ORDER — DIPHENHYDRAMINE HCL 50 MG
25 CAPSULE ORAL EVERY 6 HOURS
Refills: 0 | Status: DISCONTINUED | OUTPATIENT
Start: 2023-03-26 | End: 2023-03-28

## 2023-03-26 RX ORDER — OXYTOCIN 10 UNIT/ML
333.33 VIAL (ML) INJECTION
Qty: 20 | Refills: 0 | Status: DISCONTINUED | OUTPATIENT
Start: 2023-03-26 | End: 2023-03-26

## 2023-03-26 RX ORDER — TETANUS TOXOID, REDUCED DIPHTHERIA TOXOID AND ACELLULAR PERTUSSIS VACCINE, ADSORBED 5; 2.5; 8; 8; 2.5 [IU]/.5ML; [IU]/.5ML; UG/.5ML; UG/.5ML; UG/.5ML
0.5 SUSPENSION INTRAMUSCULAR ONCE
Refills: 0 | Status: DISCONTINUED | OUTPATIENT
Start: 2023-03-26 | End: 2023-03-28

## 2023-03-26 RX ORDER — SIMETHICONE 80 MG/1
80 TABLET, CHEWABLE ORAL EVERY 4 HOURS
Refills: 0 | Status: DISCONTINUED | OUTPATIENT
Start: 2023-03-26 | End: 2023-03-28

## 2023-03-26 RX ORDER — AER TRAVELER 0.5 G/1
1 SOLUTION RECTAL; TOPICAL EVERY 4 HOURS
Refills: 0 | Status: DISCONTINUED | OUTPATIENT
Start: 2023-03-26 | End: 2023-03-28

## 2023-03-26 RX ORDER — KETOROLAC TROMETHAMINE 30 MG/ML
30 SYRINGE (ML) INJECTION ONCE
Refills: 0 | Status: DISCONTINUED | OUTPATIENT
Start: 2023-03-26 | End: 2023-03-26

## 2023-03-26 RX ORDER — OXYTOCIN 10 UNIT/ML
41.67 VIAL (ML) INJECTION
Qty: 20 | Refills: 0 | Status: DISCONTINUED | OUTPATIENT
Start: 2023-03-26 | End: 2023-03-28

## 2023-03-26 RX ORDER — OXYCODONE HYDROCHLORIDE 5 MG/1
5 TABLET ORAL ONCE
Refills: 0 | Status: DISCONTINUED | OUTPATIENT
Start: 2023-03-26 | End: 2023-03-28

## 2023-03-26 RX ORDER — HYDROCORTISONE 1 %
1 OINTMENT (GRAM) TOPICAL EVERY 6 HOURS
Refills: 0 | Status: DISCONTINUED | OUTPATIENT
Start: 2023-03-26 | End: 2023-03-28

## 2023-03-26 RX ORDER — DIBUCAINE 1 %
1 OINTMENT (GRAM) RECTAL EVERY 6 HOURS
Refills: 0 | Status: DISCONTINUED | OUTPATIENT
Start: 2023-03-26 | End: 2023-03-28

## 2023-03-26 RX ADMIN — SODIUM CHLORIDE 3 MILLILITER(S): 9 INJECTION INTRAMUSCULAR; INTRAVENOUS; SUBCUTANEOUS at 14:01

## 2023-03-26 RX ADMIN — Medication 30 MILLIGRAM(S): at 10:24

## 2023-03-26 RX ADMIN — Medication 600 MILLIGRAM(S): at 21:19

## 2023-03-26 RX ADMIN — Medication 600 MILLIGRAM(S): at 22:00

## 2023-03-26 RX ADMIN — Medication 975 MILLIGRAM(S): at 13:00

## 2023-03-26 RX ADMIN — Medication 1 TABLET(S): at 13:00

## 2023-03-26 RX ADMIN — Medication 975 MILLIGRAM(S): at 23:54

## 2023-03-26 RX ADMIN — Medication 4 MILLIUNIT(S)/MIN: at 04:08

## 2023-03-26 RX ADMIN — Medication 600 MILLIGRAM(S): at 15:30

## 2023-03-26 RX ADMIN — Medication 600 MILLIGRAM(S): at 15:00

## 2023-03-26 RX ADMIN — Medication 125 MILLIUNIT(S)/MIN: at 10:23

## 2023-03-26 RX ADMIN — Medication 975 MILLIGRAM(S): at 18:12

## 2023-03-26 RX ADMIN — Medication 975 MILLIGRAM(S): at 13:30

## 2023-03-26 RX ADMIN — Medication 975 MILLIGRAM(S): at 18:42

## 2023-03-26 NOTE — OB RN DELIVERY SUMMARY - NSSELHIDDEN_OBGYN_ALL_OB_FT
[NS_DeliveryAttending1_OBGYN_ALL_OB_FT:[id number]],[NS_DeliveryRN_OBGYN_ALL_OB_FT:[id number]=]

## 2023-03-26 NOTE — OB PROVIDER LABOR PROGRESS NOTE - NS_SUBJECTIVE/OBJECTIVE_OBGYN_ALL_OB_FT
R2 Labor Note    S: Patient evaluated at bedside for placement of cervical balloon. Epidural just placed, patient comfortable.    O:  T(C): 36.6 (03-26-23 @ 00:38), Max: 36.6 (03-25-23 @ 22:12)  HR: 74 (03-26-23 @ 00:40) (67 - 162)  BP: 109/57 (03-26-23 @ 00:39) (109/57 - 115/57)  RR: 16 (03-26-23 @ 00:38) (16 - 16)  SpO2: 98% (03-26-23 @ 00:40) (77% - 99%)

## 2023-03-26 NOTE — OB RN DELIVERY SUMMARY - NS_DELIVERYRN_OBGYN_ALL_OB_FT
"Nursing Notes:   Nata Nieves LPN  3/10/2020  8:16 AM  Signed  Chief Complaint   Patient presents with     Anxiety     Initial /86   Pulse 76   Temp 96.1  F (35.6  C) (Tympanic)   Resp 20   Ht 1.88 m (6' 2\")   Wt 88 kg (194 lb)   BMI 24.91 kg/m   Estimated body mass index is 24.91 kg/m  as calculated from the following:    Height as of this encounter: 1.88 m (6' 2\").    Weight as of this encounter: 88 kg (194 lb).  Medication Reconciliation: complete  Nata Nieves LPN    SUBJECTIVE:   Damon Santiago is a 32 year old male who presents to clinic today for the following health issues:    HPI  Damon is here for possible anxiety.  He states over the last 3-4 months he has been having increasing worry, chest tightness, palpitations.  Manifested primarily in the inability to achieve an erection and worried about his own health (self proclaimed \"hypochondriac\" during today's visit).  Seems to have coincided with finding out his wife and him were pregnant with their first child.  He does admit however that he has a stressful job as well.  He tried a friend's Viagra, which was beneficial for him.  Therefore, he has sought out his own prescription - which was filled last week. He has not picked this up as it went to the incorrect pharmacy.  He has seen Urology once in the past re: abnormal urinary stream.  He had normal exam/workup/Cystoscopy and reassurance was required.    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  Required bone marrow biopsy and multiple medical testing that he remembers as being scary for him.    He denies any difficulty in appetite, sleep.  He does admit he thinks about the erection issue everyday, multiple times per day.  Then worries about other health related aspects.  If occasional tingling sensation in his feet/between his shoulder blades could be MS.  He admits to some word finding issues, decreased " concentration.    PHQ 3/10/2020   PHQ-9 Total Score 6   Q9: Thoughts of better off dead/self-harm past 2 weeks Not at all     SHAY-7 SCORE 3/10/2020   Total Score 14       Patient Active Problem List    Diagnosis Date Noted     Dysuria 02/19/2018     Priority: Medium     Anxiety state 02/19/2018     Priority: Medium     Murmur 12/05/2014     Priority: Medium     Abdominal pain, recurrent 11/30/2010     Priority: Medium     Past Medical History:   Diagnosis Date     Immune thrombocytopenic purpura (H) 2/19/2018    Overview:  platelets 12,000, petechiae     Personal history of diseases of the blood and blood-forming organs and certain disorders involving the immune mechanism (CODE)     No Comments Provided     Personal history of other mental and behavioral disorders     No Comments Provided      Past Surgical History:   Procedure Laterality Date     CYSTOSCOPY      2016,WNL     Family History   Problem Relation Age of Onset     Arthritis Father         Arthritis,Osteoarthritis     Arthritis Mother         Arthritis,Rheumatoid arthritis     Other - See Comments Mother         Psychiatric illness,Depression and anxiety     Thyroid Disease Mother         Thyroid Disease,Hypothyroid     Social History     Tobacco Use     Smoking status: Never Smoker     Smokeless tobacco: Never Used   Substance Use Topics     Alcohol use: Yes     Comment: Alcoholic Drinks/day: social     Social History     Social History Narrative     at [employer] .  Engaged to be .     Current Outpatient Medications   Medication Sig Dispense Refill     sildenafil (REVATIO) 20 MG tablet Take 1-2 tablets (20-40 mg) by mouth daily as needed (ED) 40 tablet 5     Multiple Vitamin (MULTI-VITAMINS) TABS Take 1 tablet by mouth daily       Allergies   Allergen Reactions     Sulfa Drugs Unknown     Review of Systems   Constitutional: Negative for activity change, appetite change, chills, fatigue and fever.   Respiratory: Positive for chest  "tightness. Negative for cough and shortness of breath.    Cardiovascular: Negative for chest pain and palpitations.   Gastrointestinal: Negative for abdominal pain, constipation, diarrhea, heartburn and hematochezia.   Genitourinary: Negative for dysuria, frequency, genital sores, hematuria, scrotal swelling and testicular pain.   Skin: Negative for rash.   Psychiatric/Behavioral: The patient is nervous/anxious.         OBJECTIVE:     /86   Pulse 76   Temp 96.1  F (35.6  C) (Tympanic)   Resp 20   Ht 1.88 m (6' 2\")   Wt 88 kg (194 lb)   BMI 24.91 kg/m    Body mass index is 24.91 kg/m .  Physical Exam  Vitals signs and nursing note reviewed.   Constitutional:       Appearance: Normal appearance. He is normal weight.   HENT:      Head: Normocephalic and atraumatic.      Right Ear: Tympanic membrane and ear canal normal.      Left Ear: Tympanic membrane and ear canal normal.   Neck:      Musculoskeletal: Normal range of motion and neck supple.   Cardiovascular:      Rate and Rhythm: Normal rate and regular rhythm.      Pulses: Normal pulses.      Heart sounds: No murmur.   Pulmonary:      Effort: Pulmonary effort is normal.      Breath sounds: Normal breath sounds.   Abdominal:      General: Abdomen is flat.      Palpations: Abdomen is soft.   Skin:     Capillary Refill: Capillary refill takes less than 2 seconds.   Neurological:      General: No focal deficit present.      Mental Status: He is alert.   Psychiatric:         Mood and Affect: Mood normal.         Behavior: Behavior normal.     Diagnostic Test Results:  Results for orders placed or performed in visit on 03/10/20   Lipid Panel     Status: None   Result Value Ref Range    Cholesterol 132 <200 mg/dL    Triglycerides 119 <150 mg/dL    HDL Cholesterol 45 23 - 92 mg/dL    LDL Cholesterol Calculated 63 <100 mg/dL    Non HDL Cholesterol 87 <130 mg/dL   Hemoglobin A1c     Status: None   Result Value Ref Range    Hemoglobin A1C 4.9 4.0 - 6.0 % "   Comprehensive Metabolic Panel     Status: Abnormal   Result Value Ref Range    Sodium 140 134 - 144 mmol/L    Potassium 4.1 3.5 - 5.1 mmol/L    Chloride 103 98 - 107 mmol/L    Carbon Dioxide 28 21 - 31 mmol/L    Anion Gap 9 3 - 14 mmol/L    Glucose 106 (H) 70 - 105 mg/dL    Urea Nitrogen 20 7 - 25 mg/dL    Creatinine 1.06 0.70 - 1.30 mg/dL    GFR Estimate 81 >60 mL/min/[1.73_m2]    GFR Estimate If Black >90 >60 mL/min/[1.73_m2]    Calcium 9.5 8.6 - 10.3 mg/dL    Bilirubin Total 0.9 0.3 - 1.0 mg/dL    Albumin 4.7 3.5 - 5.7 g/dL    Protein Total 7.5 6.4 - 8.9 g/dL    Alkaline Phosphatase 66 34 - 104 U/L    ALT 23 7 - 52 U/L    AST 18 13 - 39 U/L   Testosterone, Total     Status: None   Result Value Ref Range    Testosterone Total 366 175 - 781 ng/dL   TSH     Status: None   Result Value Ref Range    Thyrotropin 1.09 0.34 - 5.60 IU/mL   CBC and Differential     Status: None   Result Value Ref Range    WBC 4.1 4.0 - 11.0 10e9/L    RBC Count 4.96 4.4 - 5.9 10e12/L    Hemoglobin 15.2 13.3 - 17.7 g/dL    Hematocrit 45.8 40.0 - 53.0 %    MCV 92 78 - 100 fl    MCH 30.6 26.5 - 33.0 pg    MCHC 33.2 31.5 - 36.5 g/dL    RDW 11.9 10.0 - 15.0 %    Platelet Count 192 150 - 450 10e9/L    Diff Method Automated Method     % Neutrophils 50.8 %    % Lymphocytes 34.1 %    % Monocytes 10.0 %    % Eosinophils 3.7 %    % Basophils 1.2 %    % Immature Granulocytes 0.2 %    Absolute Neutrophil 2.1 1.6 - 8.3 10e9/L    Absolute Lymphocytes 1.4 0.8 - 5.3 10e9/L    Absolute Monocytes 0.4 0.0 - 1.3 10e9/L    Absolute Eosinophils 0.2 0.0 - 0.7 10e9/L    Absolute Basophils 0.1 0.0 - 0.2 10e9/L    Abs Immature Granulocytes 0.0 0 - 0.4 10e9/L   Sedimentation Rate (ESR)     Status: None   Result Value Ref Range    Sed Rate 2 1 - 10 mm/h   CRP inflammation     Status: None   Result Value Ref Range    CRP Inflammation 0.2 <0.5 mg/L         ASSESSMENT/PLAN:     1. Other male erectile dysfunction  Will evaluate for other causes of ED; however due to  other psychological issues in the past, onset coinciding with finding out wife was pregnant - suspect this is being caused by psychological/stress/anxiety.  Discussed ssri medications and other options to help decrease effects of anxiety; however despite R/B/O - he does not want to proceed with this at this time.  He has been on these medications one other time in the past and remembers some libido/ED side effects at that time as well; therefore he is not interested in jumping to medications at this time.  Feels some relief of stress/anxiety even after today's visit; therefore he is recommended to consider therapy/counseling.  In the interim; he will be Rx sildenafil to help with ED side effects.  Discussed gaining some confidence in ability to obtain and maintain an erection can help with his anxieties.  - CBC and Differential; Future  - TSH; Future  - Testosterone, Total; Future  - Comprehensive Metabolic Panel; Future  - Hemoglobin A1c; Future  - sildenafil (REVATIO) 20 MG tablet; Take 1-2 tablets (20-40 mg) by mouth daily as needed (ED)  Dispense: 40 tablet; Refill: 5  - Lipid Panel; Future  - Lipid Panel  - Hemoglobin A1c  - Comprehensive Metabolic Panel  - Testosterone, Total  - TSH  - CBC and Differential  - CRP inflammation; Future  - Sedimentation Rate (ESR); Future  - Sedimentation Rate (ESR)  - CRP inflammation    2. Anxiety state  He was also reassured re: recent MRI and not likely to have MS.  He was offered medications as outlined above.  Will continue to follow up in 3-4 weeks when we see his wife again in OB follow up.      Nilsa Rowan, [facility]   Lucinda Meyers RN

## 2023-03-26 NOTE — OB PROVIDER DELIVERY SUMMARY - NSPROVIDERDELIVERYNOTE_OBGYN_ALL_OB_FT
GEOVANI NL FEMALE INFANT  DELAYED CORD CLAMPING X 30 SECS  APGARS 9/9  PLACENTA SPONT INTACT---UTERUS EXPLORED AND EMPTY  2ND DEGREE AND RIGHT RODRIGUEZ-URETHRAL LAC REPAIRED   CC'S  PT TO RR IN GOOD CONDITION  EVERTON MOSLEY MD

## 2023-03-26 NOTE — OB PROVIDER LABOR PROGRESS NOTE - ASSESSMENT
A/P 27y  @41w2d IOL for LT  -IOL: c/w PO, CB placed with 60cc/60cc NS, patient tolerated placement well  -Cat 1 tracing  -GBS neg  -EFW 3175  -Analgesia epi  -Anticipate     Plan per Dr. Mc Rodgers, PGY-1

## 2023-03-26 NOTE — OB RN DELIVERY SUMMARY - NS_LABORCHARACTER_OBGYN_ALL_OB
Induction of labor-Medicinal/External electronic FM Induction of labor-AROM/Induction of labor-Medicinal/External electronic FM

## 2023-03-26 NOTE — OB PROVIDER DELIVERY SUMMARY - NSSELHIDDEN_OBGYN_ALL_OB_FT
[NS_DeliveryAttending1_OBGYN_ALL_OB_FT:[id number]],[NS_DeliveryRN_OBGYN_ALL_OB_FT:[id number]=]

## 2023-03-26 NOTE — OB PROVIDER DELIVERY SUMMARY - NSLOWPPHRISK_OBGYN_A_OB
No previous uterine incision/Thompson Pregnancy/Less than or equal to 4 previous vaginal births/No known bleeding disorder/No history of postpartum hemorrhage/No other PPH risks indicated

## 2023-03-26 NOTE — OB RN DELIVERY SUMMARY - NS_SEPSISRSKCALC_OBGYN_ALL_OB_FT
EOS calculated successfully. EOS Risk Factor: 0.5/1000 live births ([facility] national incidence); GA=41w2d; Temp=98.78; ROM=2.383; GBS='Negative'; Antibiotics='No antibiotics or any antibiotics < 2 hrs prior to birth'

## 2023-03-27 ENCOUNTER — TRANSCRIPTION ENCOUNTER (OUTPATIENT)
Age: 28
End: 2023-03-27

## 2023-03-27 LAB — T PALLIDUM AB TITR SER: NEGATIVE — SIGNIFICANT CHANGE UP

## 2023-03-27 RX ORDER — IBUPROFEN 200 MG
1 TABLET ORAL
Qty: 0 | Refills: 0 | DISCHARGE
Start: 2023-03-27

## 2023-03-27 RX ORDER — METOCLOPRAMIDE HCL 10 MG
10 TABLET ORAL ONCE
Refills: 0 | Status: DISCONTINUED | OUTPATIENT
Start: 2023-03-27 | End: 2023-03-28

## 2023-03-27 RX ADMIN — Medication 600 MILLIGRAM(S): at 09:40

## 2023-03-27 RX ADMIN — Medication 975 MILLIGRAM(S): at 05:35

## 2023-03-27 RX ADMIN — Medication 600 MILLIGRAM(S): at 21:30

## 2023-03-27 RX ADMIN — Medication 600 MILLIGRAM(S): at 03:00

## 2023-03-27 RX ADMIN — Medication 600 MILLIGRAM(S): at 20:53

## 2023-03-27 RX ADMIN — Medication 600 MILLIGRAM(S): at 04:00

## 2023-03-27 RX ADMIN — Medication 975 MILLIGRAM(S): at 23:58

## 2023-03-27 RX ADMIN — Medication 975 MILLIGRAM(S): at 18:29

## 2023-03-27 RX ADMIN — Medication 975 MILLIGRAM(S): at 11:29

## 2023-03-27 RX ADMIN — Medication 600 MILLIGRAM(S): at 16:00

## 2023-03-27 RX ADMIN — Medication 600 MILLIGRAM(S): at 08:48

## 2023-03-27 RX ADMIN — Medication 975 MILLIGRAM(S): at 00:55

## 2023-03-27 RX ADMIN — Medication 600 MILLIGRAM(S): at 15:08

## 2023-03-27 RX ADMIN — Medication 1 TABLET(S): at 08:48

## 2023-03-27 RX ADMIN — Medication 975 MILLIGRAM(S): at 12:30

## 2023-03-27 NOTE — DISCHARGE NOTE OB - NPI NUMBER (FOR SYSADMIN USE ONLY) :
Spoke to pharmacist, Abimael Bray, who says patient is leaving for [facility] on Saturday, 6/11/22, and is requesting authorization to refill zolpidem early. zolpidem (AMBIEN) 10 MG tablet 30 tablet 3 3/16/2022     Sig - Route: Take 1 tablet by mouth at bedtime as needed for Sleep      Per PDMP and pharmacist zolpidem last sold on 5/25/22, #30.  RF due 6/24/22. PDMP criteria is met. Last appointment  3/16/22  No follow up scheduled    Dr. Jefe Overton, please advise as to early refill for zolpidem. [[id number]]

## 2023-03-27 NOTE — DISCHARGE NOTE OB - PATIENT PORTAL LINK FT
You can access the FollowMyHealth Patient Portal offered by [facility] by registering at the following website: [email or web address]. By joining [facility]’s FollowMyHealth portal, you will also be able to view your health information using other applications (apps) compatible with our system.

## 2023-03-27 NOTE — PROGRESS NOTE ADULT - ASSESSMENT
A/P: 26yo PPD#1 s/p . Patient is stable and doing well post-partum.     #Routine Postpartum Care  - Continue with PO analgesia  - Increase ambulation  - Continue regular diet  - No labs    Binta Rashid, PGY1 Controlled with current regime

## 2023-03-27 NOTE — DISCHARGE NOTE OB - NS MD DC FALL RISK RISK
For information on Fall & Injury Prevention, visit: [email or web address] OR  [email or web address] OR  https://www.cdc.gov/steadi/patient.html

## 2023-03-27 NOTE — DISCHARGE NOTE OB - CARE PROVIDER_API CALL
Kwame Bentley)  Obstetrics and Gynecology  [address] [place] [address], [address]  [place] [address]  Phone: [phone]  Fax: [phone]  Follow Up Time:

## 2023-03-28 PROCEDURE — 36415 COLL VENOUS BLD VENIPUNCTURE: CPT

## 2023-03-28 PROCEDURE — 59050 FETAL MONITOR W/REPORT: CPT

## 2023-03-28 PROCEDURE — 86780 TREPONEMA PALLIDUM: CPT

## 2023-03-28 PROCEDURE — 86850 RBC ANTIBODY SCREEN: CPT

## 2023-03-28 PROCEDURE — 85025 COMPLETE CBC W/AUTO DIFF WBC: CPT

## 2023-03-28 PROCEDURE — 86769 SARS-COV-2 COVID-19 ANTIBODY: CPT

## 2023-03-28 PROCEDURE — 86900 BLOOD TYPING SEROLOGIC ABO: CPT

## 2023-03-28 PROCEDURE — 86901 BLOOD TYPING SEROLOGIC RH(D): CPT

## 2023-03-28 RX ORDER — DIPHENHYDRAMINE HCL 50 MG
50 CAPSULE ORAL ONCE
Refills: 0 | Status: COMPLETED | OUTPATIENT
Start: 2023-03-28 | End: 2023-03-28

## 2023-03-28 RX ORDER — METOCLOPRAMIDE HCL 10 MG
10 TABLET ORAL ONCE
Refills: 0 | Status: DISCONTINUED | OUTPATIENT
Start: 2023-03-28 | End: 2023-03-28

## 2023-03-28 RX ADMIN — Medication 600 MILLIGRAM(S): at 08:19

## 2023-03-28 RX ADMIN — Medication 600 MILLIGRAM(S): at 15:40

## 2023-03-28 RX ADMIN — Medication 1 TABLET(S): at 12:02

## 2023-03-28 RX ADMIN — Medication 600 MILLIGRAM(S): at 16:10

## 2023-03-28 RX ADMIN — Medication 975 MILLIGRAM(S): at 07:03

## 2023-03-28 RX ADMIN — Medication 600 MILLIGRAM(S): at 03:00

## 2023-03-28 RX ADMIN — Medication 975 MILLIGRAM(S): at 12:02

## 2023-03-28 RX ADMIN — Medication 600 MILLIGRAM(S): at 08:55

## 2023-03-28 RX ADMIN — Medication 975 MILLIGRAM(S): at 00:45

## 2023-03-28 RX ADMIN — Medication 975 MILLIGRAM(S): at 06:07

## 2023-03-28 RX ADMIN — Medication 975 MILLIGRAM(S): at 12:32

## 2023-03-28 RX ADMIN — Medication 600 MILLIGRAM(S): at 02:23

## 2023-03-28 NOTE — PROGRESS NOTE ADULT - ATTENDING COMMENTS
agree with above note  ha likely "spinal ha", worsens when standing  will have anesthesia consult re possible blood patch  once resolved, stable for discharge  enrique baez md

## 2023-03-28 NOTE — PROGRESS NOTE ADULT - ASSESSMENT
A/P: 26yo PPD#2 s/p . Course c/b new onset HA. Patient is stable and meeting post-partum milestones.      #Headache  - pt hesitant to try oxycodone as it "may knock me out"   - patient considering Benadryl and Reglan    #Routine Postpartum Care  - Continue with PO analgesia  - Increase ambulation, SCDs when not ambulating  - Continue regular diet  - Discharge planning once HA is controlled       Binta Rashid, PGY1

## 2023-03-28 NOTE — PROGRESS NOTE ADULT - SUBJECTIVE AND OBJECTIVE BOX
Called for likely PDPH.  Endorses HA which began within 24hrs after epidural placement on 3/26/23.  HA is worsening and positional, not associated with other CN deficits, unrelieved by Motrin and Tylenol.  Patient does not recall unusual epidural placement other than have to have it "redone." Straightforward epidural placment upon review of EMR.  Explained risks and benefits of epidural blood patch vs. conservative management.  Patient wishes to proceed with epiural blood patch.  
OB Progress Note:  PPD#1    S: Patient feels well. Pain is well controlled. She is tolerating a regular diet and passing flatus. She is voiding spontaneously, and ambulating without difficulty. Denies CP/SOB. Denies HA/lightheadedness/dizziness. Denies N/V. Denies calf pain.    O:  Vitals:  Vital Signs Last 24 Hrs  T(C): 36.7 (27 Mar 2023 05:00), Max: 37 (26 Mar 2023 11:00)  T(F): 98 (27 Mar 2023 05:00), Max: 98.6 (26 Mar 2023 11:00)  HR: 77 (27 Mar 2023 05:00) (53 - 101)  BP: 104/67 (27 Mar 2023 05:00) (104/67 - 131/58)  BP(mean): 79 (27 Mar 2023 05:00) (79 - 79)  RR: 18 (27 Mar 2023 05:00) (18 - 20)  SpO2: 96% (27 Mar 2023 05:00) (91% - 97%)    Parameters below as of 27 Mar 2023 05:00  Patient On (Oxygen Delivery Method): room air    Physical Exam:  General: NAD  Abdomen: soft, non-tender, non-distended, fundus firm  Vaginal: lochia wnl  Extremities: no erythema/calf tenderness    MEDICATIONS  (STANDING):  acetaminophen     Tablet .. 975 milliGRAM(s) Oral <User Schedule>  dextrose 5% + lactated ringers. 1000 milliLiter(s) (125 mL/Hr) IV Continuous <Continuous>  diphtheria/tetanus/pertussis (acellular) Vaccine (Adacel) 0.5 milliLiter(s) IntraMuscular once  ibuprofen  Tablet. 600 milliGRAM(s) Oral every 6 hours  oxytocin Infusion 41.667 milliUNIT(s)/Min (125 mL/Hr) IV Continuous <Continuous>  prenatal multivitamin 1 Tablet(s) Oral daily  sodium chloride 0.9% lock flush 3 milliLiter(s) IV Push every 8 hours      Labs:  Blood type: B Positive  Rubella IgG: RPR: Negative                          10.9<L>   7.42 >-----------< 189    (  @ 23:43 )             35.0                    
OB Progress Note:  PPD#2    S: Patient c/o 6-7/10 HA which began recently. Denies photophobia/phonophobia. Denies adequate relief w/ Tylenol and Motrin. Denies SOB, CP, RUQ/epigastric pain, b/l swelling LE and UE, or vision changes.  She is tolerating a regular diet and passing flatus. She is voiding spontaneously, and ambulating without difficulty. Denies CP/SOB. Denies HA/lightheadedness/dizziness. Denies N/V. Denies calf pain.    O:  Vitals:   Vital Signs Last 24 Hrs  T(C): 37.1 (28 Mar 2023 05:57), Max: 37.1 (28 Mar 2023 05:57)  T(F): 98.8 (28 Mar 2023 05:57), Max: 98.8 (28 Mar 2023 05:57)  HR: 80 (28 Mar 2023 05:57) (64 - 80)  BP: 100/65 (28 Mar 2023 05:57) (100/65 - 126/82)  BP(mean): --  RR: 18 (28 Mar 2023 05:57) (18 - 18)  SpO2: 96% (28 Mar 2023 05:57) (96% - 96%)    Parameters below as of 28 Mar 2023 05:57  Patient On (Oxygen Delivery Method): room air      Physical Exam:  General: NAD  Abdomen: soft, non-tender, non-distended, fundus firm  Vaginal: lochia wnl  Extremities: No erythema/calf tenderness    MEDICATIONS  (STANDING):  acetaminophen     Tablet .. 975 milliGRAM(s) Oral <User Schedule>  dextrose 5% + lactated ringers. 1000 milliLiter(s) (125 mL/Hr) IV Continuous <Continuous>  diphtheria/tetanus/pertussis (acellular) Vaccine (Adacel) 0.5 milliLiter(s) IntraMuscular once  ibuprofen  Tablet. 600 milliGRAM(s) Oral every 6 hours  oxytocin Infusion 41.667 milliUNIT(s)/Min (125 mL/Hr) IV Continuous <Continuous>  prenatal multivitamin 1 Tablet(s) Oral daily  sodium chloride 0.9% lock flush 3 milliLiter(s) IV Push every 8 hours    MEDICATIONS  (PRN):  benzocaine 20%/menthol 0.5% Spray 1 Spray(s) Topical every 6 hours PRN for Perineal discomfort  dibucaine 1% Ointment 1 Application(s) Topical every 6 hours PRN Perineal discomfort  diphenhydrAMINE 25 milliGRAM(s) Oral every 6 hours PRN Pruritus  hydrocortisone 1% Cream 1 Application(s) Topical every 6 hours PRN Moderate Pain (4-6)  lanolin Ointment 1 Application(s) Topical every 6 hours PRN nipple soreness  magnesium hydroxide Suspension 30 milliLiter(s) Oral two times a day PRN Constipation  metoclopramide 10 milliGRAM(s) Oral once PRN headache  oxyCODONE    IR 5 milliGRAM(s) Oral every 3 hours PRN Moderate to Severe Pain (4-10)  oxyCODONE    IR 5 milliGRAM(s) Oral once PRN Moderate to Severe Pain (4-10)  pramoxine 1%/zinc 5% Cream 1 Application(s) Topical every 4 hours PRN Moderate Pain (4-6)  simethicone 80 milliGRAM(s) Chew every 4 hours PRN Gas  witch hazel Pads 1 Application(s) Topical every 4 hours PRN Perineal discomfort      Labs:  Blood type: B Positive  Rubella IgG: RPR: Negative                          10.9<L>   7.42 >-----------< 189    (  @ 23:43 )             35.0

## 2023-03-29 ENCOUNTER — OUTPATIENT (OUTPATIENT)
Dept: OUTPATIENT SERVICES | Facility: HOSPITAL | Age: 28
LOS: 1 days | End: 2023-03-29

## 2023-03-29 VITALS
HEART RATE: 64 BPM | OXYGEN SATURATION: 97 % | DIASTOLIC BLOOD PRESSURE: 73 MMHG | RESPIRATION RATE: 18 BRPM | TEMPERATURE: 98 F | SYSTOLIC BLOOD PRESSURE: 126 MMHG

## 2023-03-29 VITALS — OXYGEN SATURATION: 95 % | HEART RATE: 63 BPM

## 2023-03-29 DIAGNOSIS — Z98.890 OTHER SPECIFIED POSTPROCEDURAL STATES: Chronic | ICD-10-CM

## 2023-03-29 PROBLEM — Z85.71 PERSONAL HISTORY OF HODGKIN LYMPHOMA: Chronic | Status: ACTIVE | Noted: 2023-03-25

## 2023-03-29 NOTE — PROGRESS NOTE ADULT - SUBJECTIVE AND OBJECTIVE BOX
This patient suffered a PDPH.   She presented yesterday, two days following epidural insertion, with a positional headache.  She had a EBP with good relief, however, the HA has worsened and it is now a 7/10.  Positionality remains a feature of the HA.      Discussed with the patient the risks and benefits of an EBP.  These include creation of another dural puncture, infection and neural damage.  Patient wishes to proceed with EBP.    roderick Moore MD

## 2023-03-29 NOTE — PROGRESS NOTE ADULT - SUBJECTIVE AND OBJECTIVE BOX
Patient with improved symptoms following EBP.   Instructed to refrain from strenuous effort for 3 days.   Report to ED if signs of infection (fever) or photophobia/stiff neck.    G. Palleschi, MD

## 2024-06-21 NOTE — PATIENT PROFILE OB - WEIGHT PRE-PREGNANCY IN KG
Left VM for pt stating that the colon polyps were benign, non-cancerous. Dr Chinchilla is recommending a repeat in 5 years if appropriate.    Pt can call back if any questions.     47

## 2025-07-21 NOTE — DISCHARGE NOTE OB - MEDICATION SUMMARY - MEDICATIONS TO TAKE
Tramadol refilled as below. Pls inform pt. Pls also have pt f/u with rheum. Pls give name to Dr MARINA Trejo and to Dr Robb group. Re MRI, CT - will have pt evaluated by rheum first to see what they think    I will START or STAY ON the medications listed below when I get home from the hospital:  None
